# Patient Record
Sex: MALE | Race: BLACK OR AFRICAN AMERICAN
[De-identification: names, ages, dates, MRNs, and addresses within clinical notes are randomized per-mention and may not be internally consistent; named-entity substitution may affect disease eponyms.]

---

## 2018-09-14 ENCOUNTER — HOSPITAL ENCOUNTER (INPATIENT)
Dept: HOSPITAL 92 - ERS | Age: 63
LOS: 12 days | Discharge: NURSING, REHAB OR LONG-TERM CARE | DRG: 24 | End: 2018-09-26
Attending: INTERNAL MEDICINE | Admitting: INTERNAL MEDICINE
Payer: SELF-PAY

## 2018-09-14 VITALS — BODY MASS INDEX: 16.9 KG/M2

## 2018-09-14 DIAGNOSIS — R13.10: ICD-10-CM

## 2018-09-14 DIAGNOSIS — E86.0: ICD-10-CM

## 2018-09-14 DIAGNOSIS — I50.32: ICD-10-CM

## 2018-09-14 DIAGNOSIS — K21.9: ICD-10-CM

## 2018-09-14 DIAGNOSIS — E78.5: ICD-10-CM

## 2018-09-14 DIAGNOSIS — F10.231: ICD-10-CM

## 2018-09-14 DIAGNOSIS — I63.312: Primary | ICD-10-CM

## 2018-09-14 DIAGNOSIS — R47.02: ICD-10-CM

## 2018-09-14 DIAGNOSIS — R47.01: ICD-10-CM

## 2018-09-14 DIAGNOSIS — I11.0: ICD-10-CM

## 2018-09-14 DIAGNOSIS — F17.210: ICD-10-CM

## 2018-09-14 LAB
ALBUMIN SERPL BCG-MCNC: 3.9 G/DL (ref 3.4–4.8)
ALP SERPL-CCNC: 74 U/L (ref 40–150)
ALT SERPL W P-5'-P-CCNC: 61 U/L (ref 8–55)
ANION GAP SERPL CALC-SCNC: 14 MMOL/L (ref 10–20)
APTT PPP: 26.8 SEC (ref 22.9–36.1)
AST SERPL-CCNC: 86 U/L (ref 5–34)
BASOPHILS # BLD AUTO: 0.1 THOU/UL (ref 0–0.2)
BASOPHILS NFR BLD AUTO: 1.5 % (ref 0–1)
BILIRUB SERPL-MCNC: 0.3 MG/DL (ref 0.2–1.2)
BUN SERPL-MCNC: 12 MG/DL (ref 8.4–25.7)
CALCIUM SERPL-MCNC: 9.3 MG/DL (ref 7.8–10.44)
CHLORIDE SERPL-SCNC: 106 MMOL/L (ref 98–107)
CK MB SERPL-MCNC: 1.5 NG/ML (ref 0–6.6)
CO2 SERPL-SCNC: 21 MMOL/L (ref 23–31)
CREAT CL PREDICTED SERPL C-G-VRATE: 0 ML/MIN (ref 70–130)
EOSINOPHIL # BLD AUTO: 0.8 THOU/UL (ref 0–0.7)
EOSINOPHIL NFR BLD AUTO: 11.3 % (ref 0–10)
GLOBULIN SER CALC-MCNC: 4 G/DL (ref 2.4–3.5)
GLUCOSE SERPL-MCNC: 116 MG/DL (ref 80–115)
HGB BLD-MCNC: 14.3 G/DL (ref 14–18)
INR PPP: 1
LYMPHOCYTES # BLD: 2.5 THOU/UL (ref 1.2–3.4)
LYMPHOCYTES NFR BLD AUTO: 33.2 % (ref 21–51)
MCH RBC QN AUTO: 34 PG (ref 27–31)
MCV RBC AUTO: 101 FL (ref 78–98)
MONOCYTES # BLD AUTO: 0.6 THOU/UL (ref 0.11–0.59)
MONOCYTES NFR BLD AUTO: 8.3 % (ref 0–10)
NEUTROPHILS # BLD AUTO: 3.4 THOU/UL (ref 1.4–6.5)
NEUTROPHILS NFR BLD AUTO: 45.7 % (ref 42–75)
PLATELET # BLD AUTO: 259 THOU/UL (ref 130–400)
POTASSIUM SERPL-SCNC: 3.8 MMOL/L (ref 3.5–5.1)
PROTHROMBIN TIME: 13.4 SEC (ref 12–14.7)
RBC # BLD AUTO: 4.2 MILL/UL (ref 4.7–6.1)
SODIUM SERPL-SCNC: 137 MMOL/L (ref 136–145)
TROPONIN I SERPL DL<=0.01 NG/ML-MCNC: (no result) NG/ML (ref ?–0.03)
WBC # BLD AUTO: 7.4 THOU/UL (ref 4.8–10.8)

## 2018-09-14 PROCEDURE — 70450 CT HEAD/BRAIN W/O DYE: CPT

## 2018-09-14 PROCEDURE — C1769 GUIDE WIRE: HCPCS

## 2018-09-14 PROCEDURE — 82553 CREATINE MB FRACTION: CPT

## 2018-09-14 PROCEDURE — 70496 CT ANGIOGRAPHY HEAD: CPT

## 2018-09-14 PROCEDURE — 85025 COMPLETE CBC W/AUTO DIFF WBC: CPT

## 2018-09-14 PROCEDURE — 96365 THER/PROPH/DIAG IV INF INIT: CPT

## 2018-09-14 PROCEDURE — 3E03317 INTRODUCTION OF OTHER THROMBOLYTIC INTO PERIPHERAL VEIN, PERCUTANEOUS APPROACH: ICD-10-PCS | Performed by: INTERNAL MEDICINE

## 2018-09-14 PROCEDURE — C1757 CATH, THROMBECTOMY/EMBOLECT: HCPCS

## 2018-09-14 PROCEDURE — 70498 CT ANGIOGRAPHY NECK: CPT

## 2018-09-14 PROCEDURE — 36416 COLLJ CAPILLARY BLOOD SPEC: CPT

## 2018-09-14 PROCEDURE — 85610 PROTHROMBIN TIME: CPT

## 2018-09-14 PROCEDURE — 74018 RADEX ABDOMEN 1 VIEW: CPT

## 2018-09-14 PROCEDURE — 36415 COLL VENOUS BLD VENIPUNCTURE: CPT

## 2018-09-14 PROCEDURE — 93005 ELECTROCARDIOGRAM TRACING: CPT

## 2018-09-14 PROCEDURE — 93306 TTE W/DOPPLER COMPLETE: CPT

## 2018-09-14 PROCEDURE — C1887 CATHETER, GUIDING: HCPCS

## 2018-09-14 PROCEDURE — 03CG3ZZ EXTIRPATION OF MATTER FROM INTRACRANIAL ARTERY, PERCUTANEOUS APPROACH: ICD-10-PCS | Performed by: NEUROLOGICAL SURGERY

## 2018-09-14 PROCEDURE — A4216 STERILE WATER/SALINE, 10 ML: HCPCS

## 2018-09-14 PROCEDURE — 74230 X-RAY XM SWLNG FUNCJ C+: CPT

## 2018-09-14 PROCEDURE — 37184 PRIM ART M-THRMBC 1ST VSL: CPT

## 2018-09-14 PROCEDURE — 80053 COMPREHEN METABOLIC PANEL: CPT

## 2018-09-14 PROCEDURE — 80048 BASIC METABOLIC PNL TOTAL CA: CPT

## 2018-09-14 PROCEDURE — 85730 THROMBOPLASTIN TIME PARTIAL: CPT

## 2018-09-14 PROCEDURE — 84484 ASSAY OF TROPONIN QUANT: CPT

## 2018-09-14 RX ADMIN — Medication SCH EACH: at 21:16

## 2018-09-14 NOTE — CT
CT ANGIOGRAM OF THE HEAD

CT ANGIOGRAM OF THE NECK

9/14/18

 

HISTORY: 

Aphasia. Unable to stand and walk. Headache. 

 

COMPARISON:  

None. 

 

TECHNIQUE: 

CT angiogram of the head and neck are performed in the axial plane. Three dimensional reformatted daryn
ges are submitted for interpretation. 

 

FINDINGS:  

Cortical gray-white matter differentiation appears to be preserved. No definite hemorrhage or extra-a
xial hematoma. Adequate aeration of the sinuses and mastoid air cells. Bilateral ocular lenses are ap
propriately located. Both globes are intact. Retrobulbar fat is preserved.

 

Aerodigestive tract is patent. No obvious mass in the ------ cavity. Evaluation is limited by dental 
amalgam artifact. Epiglottis has normal caliber. Pre-epiglottic fat is preserved. There is no prevert
ebral soft tissue swelling. 

 

Symmetric attenuation of the parotid and submandibular glands. Appropriate attenuation of the thyroid
 gland. Symmetric attenuation of the sternocleidomastoid muscles.

 

Cervical spine vertebral body height is maintained. There is no fracture. There are fusion changes at
 C5-C6. Upper mediastinum and lung apices are unremarkable. Minimal dependent atelectatic changes. 

 

CT ANGIOGRAM:

The visualized aortic arch has appropriate enhancement and luminal diameter. 

 

RIGHT CAROTID:

The origin of the right carotid artery has appropriate enhancement and luminal diameter. There is mil
d atherosclerotic disease involving the distal common carotid artery and carotid bifurcation. Neverth
eless, no significant stenosis based upon NASCET criteria. 

 

Based on the sagittal reformatted images, there is short segment mild stenosis. 

 

LEFT CAROTID:

The origin of the left carotid artery has appropriate enhancement and luminal diameter. There is appr
opriate enhancement a

nd luminal diameter of the left common carotid artery. In the left carotid bifurcation, there is shor
t segment moderate stenosis based upon NASCET criteria (60% stenosis).

 

Both subclavian arteries are unremarkable. Both cervical vertebral arteries are patent throughout the
ir course in the neck. 

 

CT ANGIOGRAM OF THE HEAD:

There is appropriate enhancement in luminal diameter of the distal cervical and intracranial internal
 carotid arteries. 

 

ANTERIOR CIRCULATION:

There is appropriate enhancement and luminal diameter of the left and right A1 segments and the right
 M1 segments. There is abrupt truncation of contrast involving the proximal right M1 segment compatib
le with thrombosis. The overall number of vessels in the right and left MCA distributions are relativ
ely symmetric. Findings would imply appropriate collateralization at this time. 

 

POSTERIOR CIRCULATION:

Both PICA artery origins are unremarkable. Both PICA arteries supply a normal appearing basilar arter
y. The left and right P1 segments have symmetric enhancement and luminal diameter. 

 

IMPRESSION:  

1.      Thrombosis involving the left M1 segment. 

2.      Moderate stenosis involving the proximal left internal carotid artery. 

 

Results of the study discussed with Dr. Jewell 9/14/18 at 4:35 p.m.

 

Code CR 

 

POS: [facility]

## 2018-09-14 NOTE — CT
NONCONTRAST CT HEAD

9/14/18

 

HISTORY: 

Aphasia. Patient unable to walk or stand unassisted. Has had a headache all day. 

 

FINDINGS:  

Scattered low density areas are seen throughout the periventricular white matter which are nonspecifi
c but likely attributable to chronic small vessel ischemic changes. There are low density areas seen 
in each basal ganglia suggesting lacunar infarctions of indeterminate age. There is no evidence of an
 acute cortical infarction, hemorrhage, mass effect, or midline shift. There is a hyperdense asymmetr
ic left MCA which could be related to thrombus in the left MCA. No definitive cortical infarction is 
seen on the left. 

 

There is mild cerebral volume loss. The ventricular system is normal in size, shape and position. 

 

There is mild mucosal thickening involving the ethmoidal air cells, and left sphenoid sinus and to a 
lesser degree right sphenoid sinus. There is mild deformity of the anterior wall left maxillary antru
m as well as remote fracture and deformity involving the nasal bone. 

 

There is minimal scalp soft tissue swelling in the left supraorbital location.

 

IMPRESSION:  

1.      Hyperdense left MCA sign which suggesting acute thrombus within the left middle cerebral deb
ry. The gray-white differentiation does appear preserved, and no acute cortical infarction is seen on
 this exam. CT angiogram is recommended for further evaluation.

2.      Lacunar infarctions in each basal ganglia of indeterminate age. 

3.      Chronic small vessel ischemic changes and cerebral volume loss.

4.      Mild sinus disease.

5.      Remote fracture and deformity involving the nasal bone as well as remote fracture deformity o
f the anterior wall of the maxillary antrum. 

6.      Above findings discussed with Dr. Chery in the Emergency Department on 9/14/18 at 1616 hour
s. 

 

POS: [facility]

## 2018-09-14 NOTE — PRG
DATE OF SERVICE:  09/14/2018

 

SUBJECTIVE:  Mr. Griffin is a 62-year-old gentleman that was noted to have the abrupt onset of rig
ht hemiplegia and aphasia.  He was brought to the emergency room where he underwent a noncontrast hea
d CT which was negative for bleed.  He subsequently underwent a CT angiogram of the head which reveal
s presence of thrombus in the left M1 segment.

 

The ER physician called me and already initiated the TPA process.  She and I had a discussion and bas
ed on all criteria, made decision to bring the patient back to the angiogram suite for the purposes o
f cerebral angiography and potentially mechanical thrombectomy.

## 2018-09-15 NOTE — CT
CT BRAIN:

 

Date:  09/15/18 

 

PROVIDED CLINICAL HISTORY:   

Stroke. 

 

FINDINGS:

 

Comparison made with study dated 09/14/18. 

 

There is interval development of more conspicuous hypodensity involving the left caudate, anterior li
mb of left internal capsule, and left lentiform nucleus. The insular cortex is somewhat indistinct in
 portions. There is no evidence for intracranial hemorrhage with limitations due to patient motion. T
here is mild mass effect upon the frontal horn of the left lateral ventricle due to the caudate regio
n hypodensity. The extracranial soft tissues and osseous structures demonstrate an unremarkable CT ap
pearance. 

 

IMPRESSION: 

1.  Findings compatible with left MCA distribution infarction. 

2.  No evidence for intracranial hemorrhage. 

 

 

POS: [facility]

## 2018-09-15 NOTE — HP
CHIEF COMPLAINT:  Drug symptoms.

 

HISTORY OF PRESENT ILLNESS:  Patient is a 62-year-old male who apparently had a history of a prior st
roke who was in his usual state of health and presented to the emergency department with altered ment
al status and weakness.  In the emergency department, the patient had a CT of the brain, showed a hyp
erdense left MCA sign suggesting acute thrombus within the left MCA with no acute cortical infarction
 seen.  Lacunar infarctions of each basal ganglia of indeterminate age were noted.  Chronic small ves
adelaida ischemia, mild sinus disease and remote fracture deformity involving the nasal bone and maxillary
 antrum by a subsequent CT angiogram revealed thrombus involving the left M1 segment with moderate st
enosis involving the proximal left internal carotid artery.  Dr. Meraz evaluated the patient in the e
mergency department.  The patient had TPA initiated.  The patient was taken for mechanical thrombecto
my which apparently was unsuccessful.  Subsequently, the patient has remained somewhat aphasic with e
xpressive aphasia.  A repeat CT today shows findings compatible with left MCA distribution infarction
.

 

REVIEW OF SYSTEMS:  Not obtainable as the patient is suffering from some expressive aphasia.  The pat
ken's wife is unable to give significant more information, although she is present.

 

PAST MEDICAL HISTORY:  Notable for hypertension and occasional reflux symptoms.  She is unaware of an
y prior surgeries that he may have had.  She does not know anything about his past medical history pr
ior to that time that they have been together which she says was 2006.  She does report that he had a
 prior CVA in [place].

 

FAMILY HISTORY:  Unknown.

 

SOCIAL HISTORY:  The patient smokes a pack of cigarettes per day.  He drinks beer 2-3 cans per day.  
She is unaware if he does any drugs or not.

 

ALLERGIES:  None known.

 

MEDICATIONS:  The patient takes something for hypertension and occasionally for reflux, but those spe
cifics are not known.  The patient's wife does not know what they are.

 

PHYSICAL EXAMINATION:

VITAL SIGNS:  Temperature is 98.3, pulse 85, /61, O2 sat 97% on room air, /61.

GENERAL APPEARANCE:  Age appropriate male.  He is in no distress.  He is awake and somewhat alert.  H
e makes eye contact and will follow commands, and attempting to verbalize.  He tends to make more zoran
se than words.

HEENT:  He has arcus senilis.  No OP lesions.

CARDIOVASCULAR:  Regular rate and rhythm with no murmurs.

LUNGS:  Clear to auscultation bilaterally with good chest wall expansion, air exchange.

ABDOMEN:  Soft, nontender, nondistended.

EXTREMITIES:  Warm and dry without significant edema.

NEUROLOGIC:  The patient moves all extremities.  He will follow commands and has fairly good strength
 bilaterally, although it is unclear if he is given full efforts.  He does appear to understand that 
he is being asked a question and does try to verbalize response, but his responses again are not comp
rehensible.

 

LABORATORY DATA:  Initial labs from 09/14/2018, white count 7.4, hemoglobin 14.3, platelets 259.  INR
 1, PTT 26.8.  Chemistries notable for CO2 of 21, glucose 116, AST 86, ALT 61.

 

ASSESSMENT AND PLAN:

1.  Cerebrovascular accident of the left M1 distribution with tPA and mechanical thrombectomy.  The p
atient has some persistent left MCA infarct and has some expressive aphasia.  Appears to have fairly 
good movement, otherwise.  The patient will remain in the ICU until 24 hours after the tPA and interv
ention, then he can likely come to the stroke floor.  Neurology has been consulted as well as the str
[facility] team.

2.  Hypertension.  The patient apparently had some elevated blood pressure in the night and had a Car
dene drip started, which is now being weaned off as his blood pressures are quite good.

 

DISPOSITION:  I spoke to the patient's wife.  He is a FULL CODE presently.  We will continue to monit
or his recovery.  She reports that they are currently living with his brother, but is trying to get t
hemselves into an apartment.  We will need social work to help engage them to determine where the dis
position may ultimately be.

## 2018-09-15 NOTE — CON
DATE OF CONSULTATION:  09/15/2018

 

CONSULTING PHYSICIAN:  Hospitalist Service.

 

IMPRESSION:

1.  Left middle cerebral artery stroke.

2.  Hypertension.

3.  Tobacco use.

 

PLAN:

1.  Start aspirin after the 24-hour window from TPA.

2.  Echocardiogram.

3.  Reevaluate swallow function today.

 

HISTORY OF PRESENT ILLNESS:  Mr. Griffin is a 62-year-old gentleman who presented with expressive 
aphasia and right-sided weakness.  His CT angio revealed an M1 segment occlusion.  Dr. Meraz taken to
 the cath lab and perform thrombectomy.  He also received TPA.  He moved to the intensive care unit a
nd has been on a Cardene drip.  His blood pressures have been under good control, has not had any sub
sequent problems such as seizures.

 

PAST MEDICAL HISTORY:  As listed above.

 

ALLERGIES:  None.

 

SOCIAL HISTORY:  Positive for tobacco use.

 

FAMILY HISTORY:  Unknown.

 

REVIEW OF SYSTEMS:  Not obtainable.

 

PHYSICAL EXAMINATION:

VITAL SIGNS:  Pulse 88, blood pressure 133/56, respirations 15, saturations 96%.

HEENT:  Pupils equal and reactive.  Conjunctivae clear.  Oropharynx clear.

NECK:  Supple, no lymphadenopathy.

EXTREMITIES:  No cyanosis, clubbing or edema.

NEUROLOGIC:  He was alert and followed commands reasonably well.  He had significant expressive langu
age difficulties.  There is a right nasal labial fold flattening.  He had antigravity strength on the
 right side, though his rapid alternating movements were a bit sluggish.  Sensation was grossly intac
t.  Plantar responses were equivocal on the right and downgoing on the left.  Sensation seemed to be 
intact.  Gait is not testable.

 

LABORATORY STUDIES:  EKG shows normal sinus rhythm.

 

CT scan of the brain showed evidence of an evolving left MCA distribution ischemic changes.

 

SUMMARY:  This is a 62-year-old gentleman with a history of hypertension and thrombosis in the left M
1 segment.  He has some residual expressive aphasia, but is looking reasonably good in regards to par
alysis.  Agree with current care.  Complete his workup with an echocardiogram.

## 2018-09-15 NOTE — CON
DATE OF CONSULTATION:  09/15/2018

 

SERVICE:  Pulmonary Medicine.

 

REASON FOR CONSULTATION:  ICU patient.

 

HISTORY OF PRESENT ILLNESS:  The patient is a 62-year-old  male 
with past medical history significant for essentially nothing who presented to 
the hospital with an abrupt onset of neurologic changes.  He got TPA and 
underwent a JANES procedure with extraction of a large thrombus.  He was 
intubated for the procedure.  Afterwards, he was extubated.  Overnight, 
neurologically he remained intact.  He is following commands.  He is moving all 
4 extremities, but continues to have a persistent expressive aphasia.  As such, 
I cannot get additional elements of the history.

 

PAST MEDICAL HISTORY:

1.  Hypertension.

2.  Gastroesophageal reflux disease.

 

PAST SURGICAL HISTORY:  Unknown.

 

SOCIAL HISTORY:  The family denies significant alcohol or illicit drug use.  He 
uses tobacco on a daily basis.

 

FAMILY HISTORY:  Noncontributory.

 

ALLERGIES:  No known drug allergies.

 

MEDICATIONS:  List of his inpatient medications were reviewed.  No specific 
updates were made at this time.

 

REVIEW OF SYSTEMS:  General, head, ears, eyes, nose, throat, cardiovascular, 
respiratory, GI, , musculoskeletal, neurologic and skin is negative except as 
mentioned in the HPI.

 

PHYSICAL EXAMINATION:

VITAL SIGNS:  Afebrile, pulse 87, blood pressure 124/62, respirations 24, 
saturation 97% on room air.

GENERAL:  The patient is awake and alert, in no apparent distress.

LUNGS:  Excellent air entry with no prolonged expiratory phase, wheezing, 
rhonchi or crackles present.

HEART:  Normal rate, regular.

ABDOMEN:  Soft, nontender, nondistended.  Bowel sounds are positive.

MUSCULOSKELETAL:  No cyanosis or clubbing.  No pitting in the bilateral lower 
extremities.

NEUROLOGIC:  Grossly nonfocal.

 

LABORATORY DATA:  WBC 7.4, hemoglobin 14.3, platelets 259,000.  INR 1.0.  Basic 
metabolic profile and liver function studies are unremarkable except for a 
mildly elevated AST and ALT.  Cardiac enzymes are negative x1.

 

IMAGIN.  CT of the brain from this morning demonstrates findings compatible with 
left MCA distribution infarction with no evidence of intracranial hemorrhage.

2.  CT of the [place] demonstrates thrombus involving the left M1 
segment with moderate stenosis involving the proximal left internal carotid 
artery.

3.  Original CT of brain demonstrates hyperdense left MCA sign suggesting acute 
thrombus.  Lacunar infarcts are age indeterminate.  History of facial fractures.

 

ASSESSMENT:

1.  Cerebrovascular accident, status post TPA and JANES procedure.

2.  Hypertension.

 

DISCUSSION AND PLAN:  After 24 hours, the patient will be a candidate for 
transition out of the ICU to the stroke unit.  Pulmonary Critical Care will 
continue to follow if he remains in this location.

 

70 minutes have been devoted to this patient in various activities.  I 
personally reviewed all imaging studies and laboratory data noted within this 
document.  For fifty percent of this time, I was interacting with the patient 
at the bedside or coordinating care with the care team.  For the remainder of 
the time I was immediately available to the patient in the hospital unit.  



MICAH

## 2018-09-16 LAB
ANION GAP SERPL CALC-SCNC: 16 MMOL/L (ref 10–20)
BASOPHILS # BLD AUTO: 0.1 THOU/UL (ref 0–0.2)
BASOPHILS NFR BLD AUTO: 0.9 % (ref 0–1)
BUN SERPL-MCNC: 9 MG/DL (ref 8.4–25.7)
CALCIUM SERPL-MCNC: 8.9 MG/DL (ref 7.8–10.44)
CHLORIDE SERPL-SCNC: 102 MMOL/L (ref 98–107)
CO2 SERPL-SCNC: 20 MMOL/L (ref 23–31)
CREAT CL PREDICTED SERPL C-G-VRATE: 74 ML/MIN (ref 70–130)
EOSINOPHIL # BLD AUTO: 0.5 THOU/UL (ref 0–0.7)
EOSINOPHIL NFR BLD AUTO: 6 % (ref 0–10)
GLUCOSE SERPL-MCNC: 107 MG/DL (ref 80–115)
HGB BLD-MCNC: 14.4 G/DL (ref 14–18)
LYMPHOCYTES # BLD: 2.1 THOU/UL (ref 1.2–3.4)
LYMPHOCYTES NFR BLD AUTO: 23.8 % (ref 21–51)
MCH RBC QN AUTO: 33.4 PG (ref 27–31)
MCV RBC AUTO: 100 FL (ref 78–98)
MONOCYTES # BLD AUTO: 0.8 THOU/UL (ref 0.11–0.59)
MONOCYTES NFR BLD AUTO: 9 % (ref 0–10)
NEUTROPHILS # BLD AUTO: 5.3 THOU/UL (ref 1.4–6.5)
NEUTROPHILS NFR BLD AUTO: 60.3 % (ref 42–75)
PLATELET # BLD AUTO: 220 THOU/UL (ref 130–400)
POTASSIUM SERPL-SCNC: 3.5 MMOL/L (ref 3.5–5.1)
RBC # BLD AUTO: 4.3 MILL/UL (ref 4.7–6.1)
SODIUM SERPL-SCNC: 134 MMOL/L (ref 136–145)
WBC # BLD AUTO: 8.7 THOU/UL (ref 4.8–10.8)

## 2018-09-16 RX ADMIN — Medication SCH EACH: at 06:14

## 2018-09-16 NOTE — PDOC.PN
- Subjective


Encounter Start Date: 09/16/18


Encounter Start Time: 11:00





No speaking much because of expressive dysphasia, but denies any problems.





- Objective


Vital Signs & Weight: 


 Vital Signs (12 hours)











  Temp Pulse Pulse Pulse Pulse Resp BP


 


 09/16/18 12:51  98.9 F  88     16 


 


 09/16/18 12:31       


 


 09/16/18 12:00   81     


 


 09/16/18 10:10    76  70  72   204/92 H


 


 09/16/18 08:00  99.3 F  81     16 


 


 09/16/18 04:45  99.2 F  83     16 














  BP BP BP Pulse Ox


 


 09/16/18 12:51    212/103 H  98


 


 09/16/18 12:31    177/82 H 


 


 09/16/18 12:00    


 


 09/16/18 10:10  201/85 H  192/100 H  


 


 09/16/18 08:00    179/98 H  96


 


 09/16/18 04:45    185/95 H  96








 Weight











Admit Weight                   134 lb 14.766 oz


 


Weight                         134 lb 14.766 oz











 Most Recent Monitor Data











Heart Rate from ECG            66


 


NIBP                           144/68


 


NIBP BP-Mean                   99


 


Respiration from ECG           22


 


SpO2                           99














I&O: 


 











 09/15/18 09/16/18 09/17/18





 06:59 06:59 06:59


 


Intake Total 1043 280 900


 


Output Total 2580 860 


 


Balance -1537 -580 900











Result Diagrams: 


 09/16/18 08:17





 09/16/18 08:17





Phys Exam





- Physical Examination


Constitutional: NAD


Much more awake and alert.


Respiratory: no wheezing, no rales, no rhonchi, clear to auscultation bilateral


Cardiovascular: RRR, no significant murmur, no rub


Gastrointestinal: soft, non-tender, no distention, positive bowel sounds


Musculoskeletal: no edema


Good movement and strength of extremities.  Appears mentally intact, but


has expressive challenges.  


Psychiatric: normal affect





Dx/Plan


(1) CVA (cerebral vascular accident)


Code(s): I63.9 - CEREBRAL INFARCTION, UNSPECIFIED   Status: Acute   Comment: 

Had TPA and mechanical extraction of thrombus.  Primary residual is the 

expressive dysphasia.  Has some dysphagia as well.  Continue statin.  Add ASA.  

Continue with therapies.  Consider discharge options with CM tomorrow.   





(2) Expressive dysphasia


Code(s): R47.02 - DYSPHASIA   Status: Acute   Comment: Able to speak single 

words correctly from time to time.   





(3) Hypertension


Code(s): I10 - ESSENTIAL (PRIMARY) HYPERTENSION   Status: Acute   Comment: Has 

variable BP.  Was on BP meds at home, but his wife was unable to help us know 

what it was.  Some permissive HTN, but has PRN's for BP greater than 180.  

Higher than that now.  Will add amlodipine and HCTZ for daily treatment at this 

point.   





- Plan





* above.

## 2018-09-16 NOTE — PRG
DATE OF SERVICE:  09/16/2018

 

SERVICE:  Pulmonary Medicine.

 

INTERVAL HISTORY:  The patient is doing really remarkable from a neurologic standpoint.  He continues
 to have a little aphasia.  That being said, he has been able to get out of bed into the chair multip
le times throughout the day.  His strength actually seems to be pretty good.  He denies any shortness
 of breath.  He has a little bit of a cough, but is not bringing up any sputum.  He seems to be swall
owing okay with modifications that have been made.

 

PHYSICAL EXAMINATION:

VITAL SIGNS:  Afebrile, pulse 82, blood pressure 135/87, respirations 20, saturation 96% on room air.


GENERAL:  The patient is awake, alert, no apparent distress.

LUNGS:  Decent air entry with no prolonged expiratory phase.  There is rhonchi present, but it clears
 with cough.

HEART:  Normal rate, regular.

ABDOMEN:  Soft, nontender, nondistended.  Bowel sounds are positive.

MUSCULOSKELETAL:  No cyanosis or clubbing.  There is no pitting in the bilateral lower extremities.

NEUROLOGIC:  Grossly nonfocal.

 

LABORATORY DATA:  CBC is essentially unremarkable and/or stable.  Basic metabolic profile is signific
ant for sodium of 134.  Otherwise, it is stable.  Troponin is negative x1.

 

IMAGING:  Echocardiogram demonstrates 60% -65% ejection fraction with a diastolic dysfunction.

 

ASSESSMENT:

1.  Cerebrovascular of the left MCA, status post TPA and JANES procedure.

2.  Hypertension.

3.  Chronic diastolic heart failure.

 

DISCUSSION AND PLAN:  At this point, the patient is stable for transition out of the hospital from a 
purely respiratory perspective.  He has no further requirements for inpatient Pulmonary or Pulmonary 
or Critical Care opinion.  As such, I will sign off.  Please call with additional questions or concer
ns moving forward.

## 2018-09-16 NOTE — PRG
DATE OF SERVICE:  09/16/2018.

 

SUBJECTIVE:  Mr. Griffin is now 2 days status post TPA & mechanical 
thrombectomy for a left MCA occlusion.  Neurologically, he is doing 
extraordinarily well.  He is able to move both sides in the upper and lower 
extremities.  He is able to interact with me with respect to speech.  He does 
have mild degree of expressive aphasia.  This is a dramatic improvement as 
compared to his baseline NIH score upon presentation.

 

I will defer additional stratification for stroke risk factors and medical 
management to our hospital service.

 

MICAH

## 2018-09-17 RX ADMIN — Medication SCH ML: at 20:19

## 2018-09-17 RX ADMIN — Medication SCH ML: at 08:45

## 2018-09-17 NOTE — PDOC.EVN
Event Note





- Event Note


Event Note: 





Noticing the patient's elevated MCV, the difficulty in controlling his BP and 

the report that the patient was up and moving around much of the night, I 

followed up with the patient's wife about his alcohol consumption.  The 3 beers 

per day she initially reported that he drank daily are actually 24 oz cans 

rather than 12.  She states he has drank that much everyday since she has known 

him.  She says he drinks first thing in the morning.  He has beer in stead of 

breakfast.  I suspect this is some level of withdrawal and his BP is elevated 

as a result.  I will give some ativan now and start librium.

## 2018-09-17 NOTE — PDOC.PN
- Subjective


Encounter Start Date: 09/17/18


Encounter Start Time: 08:55





No new complaints.  Has removed his telemetry.  Wife is present.  She says the 

[facility] for the [facility].  Apparently he was on 

Lisinopril and HCTZ for BP at home.  She does not know what his blood pressure 

was running prior to this admission.  She did say [facility] was [facility] because his BP was not well controlled and the meds were to be 

increased.  They did not go that follow up, but moved here.  





- Objective


Vital Signs & Weight: 


 Vital Signs (12 hours)











  Temp Pulse Resp BP BP Pulse Ox


 


 09/17/18 08:49   75   193/96 H  


 


 09/17/18 08:41   75   193/96 H  


 


 09/17/18 08:01  98.4 F  72  16   206/105 H  96


 


 09/17/18 07:47   67   206/105 H  


 


 09/17/18 07:30       94 L


 


 09/17/18 05:32   90    


 


 09/17/18 04:00  99.3 F  90  24 H   186/103 H  98


 


 09/17/18 01:11   71    


 


 09/16/18 23:58  98.6 F  71  20   206/108 H  97


 


 09/16/18 23:29   86    


 


 09/16/18 20:56   86    








 Weight











Admit Weight                   134 lb 14.766 oz


 


Weight                         111 lb 12.8 oz











 Most Recent Monitor Data











Heart Rate from ECG            66


 


NIBP                           144/68


 


NIBP BP-Mean                   99


 


Respiration from ECG           22


 


SpO2                           99














I&O: 


 











 09/16/18 09/17/18 09/18/18





 06:59 06:59 06:59


 


Intake Total 280 900 


 


Output Total 860  


 


Balance -580 900 











Result Diagrams: 


 09/16/18 08:17





 09/16/18 08:17





Phys Exam





- Physical Examination


Constitutional: NAD


Speech is slightly better.  


Respiratory: no wheezing, no rales, no rhonchi, clear to auscultation bilateral


Cardiovascular: RRR, no significant murmur, no rub


Gastrointestinal: soft, non-tender, no distention, positive bowel sounds


Musculoskeletal: no edema


Improved, but persistent expressive dysphasia. 


Psychiatric: normal affect





Dx/Plan


(1) CVA (cerebral vascular accident)


Code(s): I63.9 - CEREBRAL INFARCTION, UNSPECIFIED   Status: Acute   Comment: 

Had TPA and mechanical extraction of thrombus.  Primary residual is the 

expressive dysphasia.  Has some dysphagia as well.  Continue statin.  Add ASA.  

Continue with therapies.  Consider discharge options with CM tomorrow.   





(2) Expressive dysphasia


Code(s): R47.02 - DYSPHASIA   Status: Acute   Comment: Using several words, but 

still very challenged.    





(3) Hypertension


Code(s): I10 - ESSENTIAL (PRIMARY) HYPERTENSION   Status: Acute   Comment: BP 

running very high overnight.  Has had several PRN IV meds given without much 

improvement.  Will increase the amlodipine dose and try the Labetalol since the 

hydralazine was not as helpful.  Suspect he was running fairly high at home 

too.  He appears asymptomatic, but challenging to know.   





- Plan





* Work on controlling BP today.  CM to work with patient's wife on dispo 

options.  They are living with his brother now.  She indicates that she has no 

way to prepare puree'd foods for him.

## 2018-09-17 NOTE — CCL
RADIOLOGY PROCEDURE NOTE:

 

Date:  09/14/18 

 

SURGEON:  Collin Meraz M.D. 

 

FIRST ASSISTANT:  None. 

 

INDICATION:  Ischemic stroke. 

 

DIAGNOSIS:  Left MCA occlusion. 

 

PROCEDURE:  Cerebral angiography with mechanical thrombectomy. 

 

ANESTHESIA:  General. 

 

TECHNIQUE:  

The patient was brought into the angiogram suite and placed under general anesthesia. Both groins wer
e prepped and draped in usual sterile fashion.  1% lidocaine was used to inject the right groin. A 5 
[place] micropuncture set was used to gain access to the right common femoral artery. Using the Seldin
kvng technique, an 8 [place] sheath was placed. An 8 [place] concentric guide catheter was passed over a
 130 cm Chavez II catheter, which was passed over a Beryllium guidewire, was then placed within the le
ft internal carotid artery. An AP and lateral angiogram was performed, which revealed complete occlus
ion of the mid portion of the left middle cerebral artery. A  Trevo device was deployed a total of 3 
times. On the 3rd time, there was complete restoration of flow into the middle cerebral artery territ
ory with TICI score equal to 3. Guide was removed. Sheath was sewn into place secondary to prior admi
nistration of TPA. The procedure came to an end without known complication.

 

IMPRESSION:  

The patient underwent successful diagnostic angiography. Angiography revealed the presence of a compl
ete occlusion of the left middle cerebral artery, which was suggested on the patient's CT angiogram. 
The patient underwent successful mechanical thrombectomy with restoration of blood flow into the midd
le cerebral artery.

## 2018-09-18 LAB
ANION GAP SERPL CALC-SCNC: 17 MMOL/L (ref 10–20)
BASOPHILS # BLD AUTO: 0.1 THOU/UL (ref 0–0.2)
BASOPHILS NFR BLD AUTO: 1.1 % (ref 0–1)
BUN SERPL-MCNC: 27 MG/DL (ref 8.4–25.7)
CALCIUM SERPL-MCNC: 9.8 MG/DL (ref 7.8–10.44)
CHLORIDE SERPL-SCNC: 94 MMOL/L (ref 98–107)
CO2 SERPL-SCNC: 22 MMOL/L (ref 23–31)
CREAT CL PREDICTED SERPL C-G-VRATE: 46 ML/MIN (ref 70–130)
EOSINOPHIL # BLD AUTO: 0.1 THOU/UL (ref 0–0.7)
EOSINOPHIL NFR BLD AUTO: 1.7 % (ref 0–10)
GLUCOSE SERPL-MCNC: 124 MG/DL (ref 80–115)
HGB BLD-MCNC: 15.6 G/DL (ref 14–18)
LYMPHOCYTES # BLD: 1.1 THOU/UL (ref 1.2–3.4)
LYMPHOCYTES NFR BLD AUTO: 12.8 % (ref 21–51)
MCH RBC QN AUTO: 33.4 PG (ref 27–31)
MCV RBC AUTO: 98.5 FL (ref 78–98)
MONOCYTES # BLD AUTO: 0.9 THOU/UL (ref 0.11–0.59)
MONOCYTES NFR BLD AUTO: 10.6 % (ref 0–10)
NEUTROPHILS # BLD AUTO: 6.5 THOU/UL (ref 1.4–6.5)
NEUTROPHILS NFR BLD AUTO: 73.9 % (ref 42–75)
PLATELET # BLD AUTO: 221 THOU/UL (ref 130–400)
POTASSIUM SERPL-SCNC: 3.5 MMOL/L (ref 3.5–5.1)
RBC # BLD AUTO: 4.66 MILL/UL (ref 4.7–6.1)
SODIUM SERPL-SCNC: 129 MMOL/L (ref 136–145)
WBC # BLD AUTO: 8.8 THOU/UL (ref 4.8–10.8)

## 2018-09-18 RX ADMIN — Medication SCH ML: at 20:19

## 2018-09-18 RX ADMIN — Medication SCH ML: at 08:52

## 2018-09-18 NOTE — PDOC.PN
- Subjective


Encounter Start Date: 09/18/18


Encounter Start Time: 09:41





Still has some agitation.  Wife said he was agitated with her when she tried to 

keep him from getting out of bed.  No other issues noted.





- Objective


Vital Signs & Weight: 


 Vital Signs (12 hours)











  Temp Pulse Resp BP Pulse Ox


 


 09/18/18 08:51   98   


 


 09/18/18 08:00  98.5 F  98  14  185/99 H  93 L


 


 09/18/18 04:00  98.0 F  81  18  163/88 H  96


 


 09/18/18 00:00  97.7 F  91  19  184/98 H  95








 Weight











Admit Weight                   134 lb 14.766 oz


 


Weight                         109 lb 9.6 oz











 Most Recent Monitor Data











Heart Rate from ECG            66


 


NIBP                           144/68


 


NIBP BP-Mean                   99


 


Respiration from ECG           22


 


SpO2                           99














I&O: 


 











 09/17/18 09/18/18 09/19/18





 06:59 06:59 06:59


 


Intake Total 900 900 


 


Balance 900 900 











Result Diagrams: 


 09/18/18 05:40





 09/18/18 05:40





Phys Exam





- Physical Examination


Constitutional: NAD


Somnolent/slightly sedated. 


Respiratory: no wheezing, no rales, no rhonchi, clear to auscultation bilateral


Cardiovascular: RRR, no significant murmur, no rub


Gastrointestinal: soft, non-tender, no distention, positive bowel sounds


Musculoskeletal: no edema





Dx/Plan


(1) CVA (cerebral vascular accident)


Code(s): I63.9 - CEREBRAL INFARCTION, UNSPECIFIED   Status: Acute   Comment: 

Had TPA and mechanical extraction of thrombus.  Primary residual is the 

expressive dysphasia.  Has some dysphagia as well.  Continue statin.  Add ASA.  

Continue with therapies.  Consider discharge options with CM tomorrow.  

Disposition limited by EtOH WD symptoms.    





(2) Expressive dysphasia


Code(s): R47.02 - DYSPHASIA   Status: Acute   Comment: Using several words, but 

still very challenged.    





(3) Hypertension


Code(s): I10 - ESSENTIAL (PRIMARY) HYPERTENSION   Status: Acute   Comment: BP 

still high, but improved.  Has PRN's.  Started Amlodipine, HCTZ, Metoprolol 

sceduled.  Baseline HTN and exacerbated by EtOH WD.   





(4) Alcohol withdrawal delirium, acute, hyperactive


Code(s): F10.231 - ALCOHOL DEPENDENCE WITH WITHDRAWAL DELIRIUM   Status: Acute 

  Comment: Thiamine, Folate, scheduled Librium and PRN ativan.     





- Plan





* Need to work through the EtOH WD.

## 2018-09-18 NOTE — RAD
KUB:

9/18/18

 

HISTORY: 

Dobhoff tube placement. 

 

Dobhoff feeding tube is present. The tip is in the distal esophagus. The tube is coiled with the core
 portion in the fundus region but the tip directed cephalad. 

 

IMPRESSION:  

Dobhoff feeding tube with the tip of the tube in the distal esophagus and the tip is directed cephala
d.

 

POS: [facility]
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
## 2018-09-18 NOTE — RAD
AP VIEW ABDOMEN

9/18/18

 

HISTORY: 

NG tube placement. 

 

AP view abdomen obtained. The Dobhoff tube has been pulled back when compared to the previous exam fr
om earlier in the day. The distal tip of the Dobhoff tube is just above the gastroesophageal junction
. The Dobhoff tube needs to be advanced approximately 10 cm to be in good position to be in the stoma
ch and advanced approximately 20 cm to be in the duodenum. 

 

IMPRESSION:  

The Dobhoff tube is at the level of the GE junction. 

 

POS: [facility]

## 2018-09-19 RX ADMIN — Medication SCH ML: at 07:47

## 2018-09-19 NOTE — RAD
KUB:

 

HISTORY:

Dobhoff tube placement.

 

COMPARISON:

Prior day's study.

 

FINDINGS:

A Dobhoff feeding tube is seen.  The tip of the tube is in the fundus of the stomach.

 

IMPRESSION:

Dobhoff feeding tube with the tip in the fundus of the stomach.

 

POS: [facility]

## 2018-09-19 NOTE — PDOC.PN
- Subjective


Encounter Start Date: 09/19/18


Encounter Start Time: 11:20





Essentially non-verbal.  





- Objective


Vital Signs & Weight: 


 Vital Signs (12 hours)











  Temp Pulse Pulse Pulse Resp BP BP


 


 09/19/18 12:05       121/71 


 


 09/19/18 11:37  97.6 F  66    17  


 


 09/19/18 08:55    63  65    134/79


 


 09/19/18 08:05       109/61 


 


 09/19/18 07:45   76     109/61 


 


 09/19/18 07:30       


 


 09/19/18 07:28  99.1 F  76    16  


 


 09/19/18 04:00  98.6 F  85    22 H  131/73 














  BP BP Pulse Ox


 


 09/19/18 12:05   


 


 09/19/18 11:37   121/71  96


 


 09/19/18 08:55  117/71  


 


 09/19/18 08:05   


 


 09/19/18 07:45   


 


 09/19/18 07:30    99


 


 09/19/18 07:28   109/61  99


 


 09/19/18 04:00   131/73  95








 Weight











Admit Weight                   134 lb 14.766 oz


 


Weight                         116 lb 3 oz











 Most Recent Monitor Data











Heart Rate from ECG            66


 


NIBP                           144/68


 


NIBP BP-Mean                   99


 


Respiration from ECG           22


 


SpO2                           99














I&O: 


 











 09/18/18 09/19/18 09/20/18





 06:59 06:59 06:59


 


Intake Total 900 95 300


 


Balance 900 95 300











Result Diagrams: 


 09/18/18 05:40





 09/18/18 05:40





Phys Exam





- Physical Examination


Constitutional: NAD


Respiratory: no wheezing, no rales, no rhonchi, clear to auscultation bilateral


Cardiovascular: RRR, no significant murmur, no rub


Gastrointestinal: soft, non-tender, no distention, positive bowel sounds


Musculoskeletal: no edema


Expressive dysphagia.  


Psychiatric: normal affect





Dx/Plan


(1) CVA (cerebral vascular accident)


Code(s): I63.9 - CEREBRAL INFARCTION, UNSPECIFIED   Status: Acute   Comment: 

Had TPA and mechanical extraction of thrombus.  Primary residual is the 

expressive dysphasia.  Has some dysphagia as well.  Continue statin.  Add ASA.  

Continue with therapies.  Referal sent to inpatient rehab.   





(2) Expressive dysphasia


Code(s): R47.02 - DYSPHASIA   Status: Acute   Comment: Using several words, but 

still very challenged.    





(3) Hypertension


Code(s): I10 - ESSENTIAL (PRIMARY) HYPERTENSION   Status: Acute   Comment: Much 

improved.  Started Amlodipine, HCTZ, Metoprolol scheduled.     





(4) Alcohol withdrawal delirium, acute, hyperactive


Code(s): F10.231 - ALCOHOL DEPENDENCE WITH WITHDRAWAL DELIRIUM   Status: Acute 

  Comment: Thiamine, Folate, scheduled Librium and PRN ativan. Decreasing the 

Librium to 10 mg as he appears to be much better.     





(5) Dysphagia


Code(s): R13.10 - DYSPHAGIA, UNSPECIFIED   Status: Acute   Comment: Patient 

failed the MBS.  Will likely need PEG.  Will discuss with patient and family.  

   





- Plan





* Need to work toward PEG and then rehab.  WD symptoms appear to be much 

improved.

## 2018-09-19 NOTE — RAD
MODIFIED BARIUM SWALLOW PERFORMED WITH SPEECH THERAPIST:

 

HISTORY:

Dysphagia.  Feeding difficulty.  The patient has a history of a stroke.

 

The patient was given thin barium and honey, nectar, and pudding textures, which showed aspiration wi
th several of the consistencies.  Penetration was only demonstrated with the thicker pudding, but onl
y a small amount of pudding was given.  The exam was terminated at that time.

 

IMPRESSION:

Aspiration with various materials.

 

POS: [facility]

## 2018-09-20 LAB
ANION GAP SERPL CALC-SCNC: 14 MMOL/L (ref 10–20)
BUN SERPL-MCNC: 52 MG/DL (ref 8.4–25.7)
CALCIUM SERPL-MCNC: 9.5 MG/DL (ref 7.8–10.44)
CHLORIDE SERPL-SCNC: 97 MMOL/L (ref 98–107)
CO2 SERPL-SCNC: 26 MMOL/L (ref 23–31)
CREAT CL PREDICTED SERPL C-G-VRATE: 43 ML/MIN (ref 70–130)
GLUCOSE SERPL-MCNC: 105 MG/DL (ref 80–115)
POTASSIUM SERPL-SCNC: 3.6 MMOL/L (ref 3.5–5.1)
SODIUM SERPL-SCNC: 133 MMOL/L (ref 136–145)

## 2018-09-20 RX ADMIN — Medication SCH ML: at 09:16

## 2018-09-20 RX ADMIN — Medication SCH ML: at 20:35

## 2018-09-20 RX ADMIN — Medication SCH ML: at 00:48

## 2018-09-20 NOTE — PDOC.PN
- Subjective


Encounter Start Date: 09/20/18


Encounter Start Time: 08:40





Doing well.  Speech improved, but still minimally communicative. 





- Objective


Vital Signs & Weight: 


 Vital Signs (12 hours)











  Temp Pulse Resp BP BP Pulse Ox


 


 09/20/18 08:00  97.8 F  84  18   128/84  96


 


 09/20/18 04:00  98.5 F  78  18  125/76  125/76  95


 


 09/20/18 00:00  98.7 F  85  16   132/77  96








 Weight











Admit Weight                   134 lb 14.766 oz


 


Weight                         116 lb 3 oz











 Most Recent Monitor Data











Heart Rate from ECG            66


 


NIBP                           144/68


 


NIBP BP-Mean                   99


 


Respiration from ECG           22


 


SpO2                           99














I&O: 


 











 09/19/18 09/20/18 09/21/18





 06:59 06:59 06:59


 


Intake Total 95 390 


 


Balance 95 390 











Result Diagrams: 


 09/18/18 05:40





 09/20/18 05:17





Phys Exam





- Physical Examination


Constitutional: NAD


Respiratory: no wheezing, no rales, no rhonchi, clear to auscultation bilateral


Cardiovascular: RRR, no significant murmur, no rub


Gastrointestinal: soft, non-tender, no distention, positive bowel sounds


Musculoskeletal: no edema


Expressive dysphasia.


Skin: normal turgor





Dx/Plan


(1) CVA (cerebral vascular accident)


Code(s): I63.9 - CEREBRAL INFARCTION, UNSPECIFIED   Status: Acute   Comment: 

Had TPA and mechanical extraction of thrombus.  Primary residual is the 

expressive dysphasia.  Has some dysphagia as well.  Continue statin.  Add ASA.  

Continue with therapies.  Referal sent to inpatient rehab.   





(2) Expressive dysphasia


Code(s): R47.02 - DYSPHASIA   Status: Acute   Comment: Using several words, but 

still very challenged. Appears to be improving.    





(3) Hypertension


Code(s): I10 - ESSENTIAL (PRIMARY) HYPERTENSION   Status: Acute   Comment: Much 

improved.  Started Amlodipine, HCTZ, Metoprolol scheduled.     





(4) Alcohol withdrawal delirium, acute, hyperactive


Code(s): F10.231 - ALCOHOL DEPENDENCE WITH WITHDRAWAL DELIRIUM   Status: Acute 

  Comment: Thiamine, Folate, scheduled Librium and PRN ativan. Will discontinue 

the librium.   





(5) Dysphagia


Code(s): R13.10 - DYSPHAGIA, UNSPECIFIED   Status: Acute   Comment: Patient 

failed the MBS.  He is on modified diet now.  He ate about 90% of breakfast and 

drank his juice and milk.  Encouraged that he may be able to do better as be 

remove the librium entirely in light of the resolving WD symptoms.  I discussed 

the possibility of a PEG.  I think he understands the situation adequately and 

he is amenable to a PEG if it becomes necessary.  I think he can consent for 

himself.  He has had two DHT's placed and both were pulled out.  Unclear if 

that was accidental or intentional.  Nursing believes it was accidentally 

pulled out when he was eating.     





(6) Dehydration


Code(s): E86.0 - DEHYDRATION   Status: Acute   Comment: His intake has been 

poor until this morning.  He has not been able to maintain a DHT.  BUN is up 

today.  Suspect dehyrating.  Will give IV fluids today.  Encouraged better PO 

intake.    





- Plan





* Monitor intake today.  If inadequate, consider PEG tomorrow.  If ok, can 

likely to go rehab if approved.

## 2018-09-21 RX ADMIN — Medication SCH: at 08:37

## 2018-09-21 RX ADMIN — Medication SCH ML: at 20:13

## 2018-09-21 NOTE — PDOC.PN
- Subjective


Encounter Start Date: 09/21/18


Encounter Start Time: 10:00


Subjective: no sob, awake, knows his girlfriend is at bedside


-: didn't like much of his breakfast


-: ate well his lunch and dinner yesterday per staff





- Objective


MAR Reviewed: Yes


Vital Signs & Weight: 


 Vital Signs (12 hours)











  Temp Pulse Pulse Pulse Resp BP BP


 


 09/21/18 11:45  98 F  66    16  


 


 09/21/18 09:29   64     


 


 09/21/18 08:40    80  83    220/98 H


 


 09/21/18 08:36   68     155/88 H 


 


 09/21/18 08:27       


 


 09/21/18 08:04       


 


 09/21/18 08:00  97.5 F L  70    16  


 


 09/21/18 07:39       


 


 09/21/18 03:40  98.9 F  62    18  














  BP BP Pulse Ox


 


 09/21/18 11:45   148/98 H  97


 


 09/21/18 09:29   170/96 H 


 


 09/21/18 08:40  158/94 H  


 


 09/21/18 08:36   


 


 09/21/18 08:27    98


 


 09/21/18 08:04    98


 


 09/21/18 08:00   155/88 H  98


 


 09/21/18 07:39    98


 


 09/21/18 03:40   168/86 H  98








 Weight











Admit Weight                   134 lb 14.766 oz


 


Weight                         116 lb 3 oz











 Most Recent Monitor Data











Heart Rate from ECG            66


 


NIBP                           144/68


 


NIBP BP-Mean                   99


 


Respiration from ECG           22


 


SpO2                           99














I&O: 


 











 09/20/18 09/21/18 09/22/18





 06:59 06:59 06:59


 


Intake Total 390 300 


 


Balance 390 300 











Result Diagrams: 


 09/18/18 05:40





 09/20/18 05:17





Phys Exam





- Physical Examination


HEENT: PERRLA, moist MMs


Neck: no JVD, supple


Respiratory: no wheezing, no rales


Cardiovascular: RRR, no significant murmur


Gastrointestinal: soft, non-tender, positive bowel sounds


Musculoskeletal: no edema, pulses present


Neurological: non-focal, moves all 4 limbs





Dx/Plan


(1) CVA (cerebral vascular accident)


Code(s): I63.9 - CEREBRAL INFARCTION, UNSPECIFIED   Status: Acute   Comment: 

Had TPA and mechanical extraction of thrombus.  Primary residual is the 

expressive dysphasia.  Has some dysphagia as well.  Continue statin.  Add ASA.  

Continue with therapies.     





(2) Dyslipidemia


Code(s): E78.5 - HYPERLIPIDEMIA, UNSPECIFIED   Status: Chronic   





(3) Aphasia


Code(s): R47.01 - APHASIA   Status: Acute   





(4) Dysphagia


Code(s): R13.10 - DYSPHAGIA, UNSPECIFIED   Status: Acute   





(5) Hypertension


Code(s): I10 - ESSENTIAL (PRIMARY) HYPERTENSION   Status: Acute   


Qualifiers: 


   Hypertension type: essential hypertension   Qualified Code(s): I10 - 

Essential (primary) hypertension   


Comment: Much improved.  On Amlodipine, HCTZ, Metoprolol scheduled.     





- Plan


hemo/neuro stable


-: reduce iv fluids


-: on asp, lipitor


-: ambulate well with rw


-: awaiting placement, still has some cognitive dysfunction, dysphagia and aph





* .








Review of Systems





- Medications/Allergies


Allergies/Adverse Reactions: 


 Allergies











Allergy/AdvReac Type Severity Reaction Status Date / Time


 


No Known Allergies Allergy   Unverified 09/14/18 19:32











Medications: 


 Current Medications





Amlodipine Besylate (Norvasc)  10 mg PO DAILY [facility]


   Last Admin: 09/21/18 08:36 Dose:  10 mg


Aspirin (Aspirin)  325 mg PO DAILY [facility]


   Last Admin: 09/21/18 08:37 Dose:  325 mg


Atorvastatin Calcium (Lipitor)  80 mg PO HS [facility]


   Last Admin: 09/20/18 20:34 Dose:  80 mg


Enoxaparin Sodium (Lovenox)  40 mg SC 0900 [facility]


   Last Admin: 09/21/18 08:36 Dose:  40 mg


Folic Acid (Folvite)  1 mg PO DAILY [facility]


   Last Admin: 09/21/18 08:37 Dose:  1 mg


Hydralazine HCl (Apresoline)  20 mg SLOW IVP Q15MIN PRN


   PRN Reason: SBP Greater Than 180


   Last Admin: 09/17/18 08:41 Dose:  20 mg


Hydrochlorothiazide (Hydrochlorothiazide)  25 mg PO DAILY [facility]


   Last Admin: 09/21/18 08:37 Dose:  25 mg


Sodium Chloride (1/2 Normal Saline)  1,000 mls @ 50 mls/hr IV .Q20H [facility]


Labetalol HCl (Normodyne)  20 mg SLOW IVP Q10MIN PRN


   PRN Reason: SBP Greater Than 180


   Last Admin: 09/18/18 15:52 Dose:  20 mg


Lorazepam (Ativan)  1 mg PO Q4H PRN


   PRN Reason: Anxiety/Agitation


   Last Admin: 09/19/18 15:57 Dose:  1 mg


Lorazepam (Ativan)  2 mg SLOW IVP Q6H PRN


   PRN Reason: Anxiety/Agitation


   Last Admin: 09/17/18 20:20 Dose:  2 mg


Metoprolol Tartrate (Lopressor)  25 mg PO BID [facility]


   Last Admin: 09/21/18 08:37 Dose:  25 mg


Sodium Chloride (Flush - Normal Saline)  10 ml IVF Q12HR [facility]


   Last Admin: 09/21/18 08:37 Dose:  Not Given


Sodium Chloride (Flush - Normal Saline)  10 ml IVF PRN PRN


   PRN Reason: Saline Flush


Thiamine HCl (Thiamine)  100 mg PO DAILY [facility]


   Last Admin: 09/21/18 08:37 Dose:  100 mg

## 2018-09-21 NOTE — PQF
ATUL REICH, BHAVANI MINOR MD

[id number]                                                             U-A10

[id number]                             

                                   

CLINICAL DOCUMENTATION IMPROVEMENT CLARIFICATION FORM:  ICD-10 Updated



PLEASE DO AN ADDENDUM TO THE PROGRESS NOTE WITH ANY DOCUMENTATION UPDATES OR 
ADDITIONS AND CARRY THROUGH TO DC SUMMARY.   THANK YOU.



Date:   9-21-18                                                              
ATTN:  DR. DEL REAL



Please exercise your independent, professional judgment in responding to the 
clarification form. 

Clinical indicators are provided on the bottom of this form for your review



Please check appropriate box(s):

[  ] Protein Calorie Malnutrition:    [  ] Mild      [  ] Moderate   [  ] 
Severe   

[  ] Other Malnutrition (please specify) _______________________________________
__

[  ] Underweight without malnutrition

[  ] Cachexia 

[  x] Other diagnosis _Has ac cva and is slowly increasing his oral intake._____
_____

[  ] Unable to determine



In addition, please specify:

Present on Admission (POA):  [  ] Yes             [  ] No             [  ] 
Unable to determine



CLINICAL INDICATORS - SIGNS / SYMPTOMS / LABS



BMI 17.6

DIETARY CONSULT:

19% wt loss compared to admit wt

69% last 4 meals consumed and girlfriend report of limited PO intake





RISK FACTORS

H&P:  ACUTE THROMBUS W/ LEFT MCA

          DRINKS 2-3 CANS OF BEER PER DAY



TREATMENT:



9-15/9/18: Dietary consult



Dietary Recommendations: 

1. Continue Heart Healthy/purree/nectar thick. Texture/consistency per SLP. 

2. If patient safe for PO intake, recommend a Heart Healthy diet type with 
Ensure Enlive BID.

3. Consider appetite stimulant.

4. If unable to take PO, recommend initiating Jevity 1.2 and advancing as 
tolerated to goal rate of 60 mL/hr with 95 mL water flushes every 4 hours once

    IVF is off. 





Moderate Malnutrition (in acute illness)

 Energy Intake: <75% of estimated energy requirement for > 7 days

 Weight Loss:  1-2%/1 week;  5%/ 1 month; 7.5%/3 months

 Other: mild body fat loss; mild muscle mass loss; mild fluid accumulation; 



Severe Malnutrition (in acute illness)

 Energy Intake: < 50% of estimated energy requirement for > 5 days

 Weight Loss: >1-2%/1 week; >5%/1 month; >7.5%/3 months

 Other: moderate body fat loss; moderate muscle mass loss; moderate- severe 
fluid accumulation; measurably reduced  strength



Moderate Malnutrition (in chronic illness)

 Energy Intake: <75% of estimated energy requirement for >1 month

 Weight Loss: 5%/1 month; 7.5%/3 months; 10%/6 months; 20%/1 year

 Other: mild body fat loss; mild muscle mass loss; mild fluid accumulation



Severe Malnutrition (in chronic illness)

 Energy Intake: <75% of estimated energy requirement for >1 month

 Weight Loss: >5%/1 month; >7.5%/3 months; >10%/6 months; >20%/1 year

 Other: severe body fat loss; severe muscle mass loss; severe fluid 
accumulation; measurably reduced  strength





THANK YOU,

CHIOMA



(This form is maintained as a part of the permanent medical record)

 2015 [employer]  All Rights Reserved

Chioma Mclaughlin RN, BS    [email or web address]    Cell Phone:  [phone]

                                                              

[facility]

## 2018-09-22 LAB
ANION GAP SERPL CALC-SCNC: 18 MMOL/L (ref 10–20)
BASOPHILS # BLD AUTO: 0.1 THOU/UL (ref 0–0.2)
BASOPHILS NFR BLD AUTO: 0.8 % (ref 0–1)
BUN SERPL-MCNC: 20 MG/DL (ref 8.4–25.7)
CALCIUM SERPL-MCNC: 10.3 MG/DL (ref 7.8–10.44)
CHLORIDE SERPL-SCNC: 93 MMOL/L (ref 98–107)
CO2 SERPL-SCNC: 22 MMOL/L (ref 23–31)
CREAT CL PREDICTED SERPL C-G-VRATE: 65 ML/MIN (ref 70–130)
EOSINOPHIL # BLD AUTO: 0.3 THOU/UL (ref 0–0.7)
EOSINOPHIL NFR BLD AUTO: 3.6 % (ref 0–10)
GLUCOSE SERPL-MCNC: 111 MG/DL (ref 80–115)
HGB BLD-MCNC: 17.2 G/DL (ref 14–18)
LYMPHOCYTES # BLD: 2.7 THOU/UL (ref 1.2–3.4)
LYMPHOCYTES NFR BLD AUTO: 28.5 % (ref 21–51)
MCH RBC QN AUTO: 32.6 PG (ref 27–31)
MCV RBC AUTO: 97.5 FL (ref 78–98)
MONOCYTES # BLD AUTO: 1.1 THOU/UL (ref 0.11–0.59)
MONOCYTES NFR BLD AUTO: 11.6 % (ref 0–10)
NEUTROPHILS # BLD AUTO: 5.2 THOU/UL (ref 1.4–6.5)
NEUTROPHILS NFR BLD AUTO: 55.5 % (ref 42–75)
PLATELET # BLD AUTO: 321 THOU/UL (ref 130–400)
POTASSIUM SERPL-SCNC: 3.8 MMOL/L (ref 3.5–5.1)
RBC # BLD AUTO: 5.26 MILL/UL (ref 4.7–6.1)
SODIUM SERPL-SCNC: 129 MMOL/L (ref 136–145)
WBC # BLD AUTO: 9.4 THOU/UL (ref 4.8–10.8)

## 2018-09-22 RX ADMIN — Medication SCH: at 09:03

## 2018-09-22 RX ADMIN — Medication SCH: at 18:59

## 2018-09-22 NOTE — EKG
Test Reason : 

Blood Pressure : ***/*** mmHG

Vent. Rate : 080 BPM     Atrial Rate : 081 BPM

   P-R Int : 174 ms          QRS Dur : 100 ms

    QT Int : 372 ms       P-R-T Axes : 035 023 021 degrees

   QTc Int : 429 ms

 

*** Poor data quality, interpretation may be adversely affected

Normal sinus rhythm

Possible Left atrial enlargement

Left ventricular hypertrophy

ST elevation, consider early repolarization, pericarditis, or injury

Abnormal ECG

 

Confirmed by PK WESTON DO ([id number]),  VETO OLIVER ([id number]) on 9/22/2018 11:52:55 AM

 

Referred By:             Confirmed By:PK WESTON DO

## 2018-09-22 NOTE — PDOC.PN
- Subjective


Encounter Start Date: 09/22/18


Encounter Start Time: 13:30


Subjective: awake, eating well per staff





- Objective


MAR Reviewed: Yes


Vital Signs & Weight: 


 Vital Signs (12 hours)











  Temp Pulse Resp BP BP Pulse Ox


 


 09/22/18 12:00     141/79 H  


 


 09/22/18 11:26  97.8 F  64  16   141/79 H  99


 


 09/22/18 09:02   67   164/88 H  


 


 09/22/18 08:00     164/88 H   96


 


 09/22/18 07:25  97.9 F  67  18   164/88 H  96


 


 09/22/18 04:00  97.6 F  63  18   151/78 H  98








 Weight











Admit Weight                   134 lb 14.766 oz


 


Weight                         105 lb 1.6 oz











 Most Recent Monitor Data











Heart Rate from ECG            66


 


NIBP                           144/68


 


NIBP BP-Mean                   99


 


Respiration from ECG           22


 


SpO2                           99














I&O: 


 











 09/21/18 09/22/18 09/23/18





 06:59 06:59 06:59


 


Intake Total 300  


 


Balance 300  











Result Diagrams: 


 09/22/18 09:41





 09/22/18 09:41





Phys Exam





- Physical Examination


HEENT: PERRLA, moist MMs


Neck: no JVD, supple


Respiratory: no wheezing, no rales


Cardiovascular: RRR, no significant murmur


Gastrointestinal: soft, non-tender, positive bowel sounds


Musculoskeletal: no edema, pulses present


Neurological: non-focal, moves all 4 limbs





Dx/Plan


(1) CVA (cerebral vascular accident)


Code(s): I63.9 - CEREBRAL INFARCTION, UNSPECIFIED   Status: Acute   Comment: 

Had TPA and mechanical extraction of thrombus.  Primary residual is the 

expressive dysphasia.  Has some dysphagia as well.  Continue statin.  Add ASA.  

Continue with therapies.     





(2) Dyslipidemia


Code(s): E78.5 - HYPERLIPIDEMIA, UNSPECIFIED   Status: Chronic   





(3) Aphasia


Code(s): R47.01 - APHASIA   Status: Acute   





(4) Dysphagia


Code(s): R13.10 - DYSPHAGIA, UNSPECIFIED   Status: Acute   





(5) Hypertension


Code(s): I10 - ESSENTIAL (PRIMARY) HYPERTENSION   Status: Acute   


Qualifiers: 


   Hypertension type: essential hypertension   Qualified Code(s): I10 - 

Essential (primary) hypertension   


Comment: Much improved.  On Amlodipine, HCTZ, Metoprolol scheduled.     





- Plan


awaiting placement


-: is tolerating oral diet


-: on asp, lipitor


-: to ambulate with PT as tolerated





* .








Review of Systems





- Medications/Allergies


Allergies/Adverse Reactions: 


 Allergies











Allergy/AdvReac Type Severity Reaction Status Date / Time


 


No Known Allergies Allergy   Unverified 09/14/18 19:32











Medications: 


 Current Medications





Amlodipine Besylate (Norvasc)  10 mg PO DAILY [facility]


   Last Admin: 09/22/18 09:02 Dose:  10 mg


Aspirin (Aspirin)  325 mg PO DAILY [facility]


   Last Admin: 09/22/18 09:01 Dose:  325 mg


Atorvastatin Calcium (Lipitor)  80 mg PO HS [facility]


   Last Admin: 09/21/18 20:13 Dose:  80 mg


Enoxaparin Sodium (Lovenox)  40 mg SC 0900 [facility]


   Last Admin: 09/22/18 09:02 Dose:  40 mg


Folic Acid (Folvite)  1 mg PO DAILY [facility]


   Last Admin: 09/22/18 09:02 Dose:  1 mg


Hydralazine HCl (Apresoline)  20 mg SLOW IVP Q15MIN PRN


   PRN Reason: SBP Greater Than 180


   Last Admin: 09/17/18 08:41 Dose:  20 mg


Hydrochlorothiazide (Hydrochlorothiazide)  25 mg PO DAILY [facility]


   Last Admin: 09/22/18 09:01 Dose:  25 mg


Labetalol HCl (Normodyne)  20 mg SLOW IVP Q10MIN PRN


   PRN Reason: SBP Greater Than 180


   Last Admin: 09/18/18 15:52 Dose:  20 mg


Lorazepam (Ativan)  1 mg PO Q4H PRN


   PRN Reason: Anxiety/Agitation


   Last Admin: 09/22/18 09:02 Dose:  1 mg


Lorazepam (Ativan)  2 mg SLOW IVP Q6H PRN


   PRN Reason: Anxiety/Agitation


   Last Admin: 09/17/18 20:20 Dose:  2 mg


Metoprolol Tartrate (Lopressor)  50 mg PO BID [facility]


   Last Admin: 09/22/18 09:01 Dose:  50 mg


Sodium Chloride (Flush - Normal Saline)  10 ml IVF Q12HR [facility]


   Last Admin: 09/22/18 09:03 Dose:  Not Given


Sodium Chloride (Flush - Normal Saline)  10 ml IVF PRN PRN


   PRN Reason: Saline Flush


Thiamine HCl (Thiamine)  100 mg PO DAILY [facility]


   Last Admin: 09/22/18 09:02 Dose:  100 mg

## 2018-09-23 RX ADMIN — Medication SCH: at 08:02

## 2018-09-23 RX ADMIN — Medication SCH: at 20:44

## 2018-09-23 NOTE — PDOC.PN
- Subjective


Encounter Start Date: 09/23/18


Encounter Start Time: 12:25


Subjective: awake, no sob or new weakness


-: communicates slowly


-: is not eating much today per staff





- Objective


MAR Reviewed: Yes


Vital Signs & Weight: 


 Vital Signs (12 hours)











  Temp Pulse Resp BP BP Pulse Ox


 


 09/23/18 12:00     164/90 H  


 


 09/23/18 11:11  98.2 F  80  16   164/90 H  97


 


 09/23/18 08:10   83    


 


 09/23/18 08:00     137/84   94 L


 


 09/23/18 07:14  97.9 F  83  16   137/84  96








 Weight











Admit Weight                   134 lb 14.766 oz


 


Weight                         105 lb 1.6 oz











 Most Recent Monitor Data











Heart Rate from ECG            66


 


NIBP                           144/68


 


NIBP BP-Mean                   99


 


Respiration from ECG           22


 


SpO2                           99














I&O: 


 











 09/22/18 09/23/18 09/24/18





 06:59 06:59 06:59


 


Intake Total  800 180


 


Balance  800 180











Result Diagrams: 


 09/22/18 09:41





 09/22/18 09:41





Phys Exam





- Physical Examination


HEENT: PERRLA, moist MMs


Neck: no JVD, supple


Respiratory: no wheezing, no rales


Cardiovascular: RRR, no significant murmur


Gastrointestinal: soft, non-tender, positive bowel sounds


Musculoskeletal: no edema, pulses present


Neurological: moves all 4 limbs


dysarthria, dysphasia


Psychiatric: A&O x 3





Dx/Plan


(1) CVA (cerebral vascular accident)


Code(s): I63.9 - CEREBRAL INFARCTION, UNSPECIFIED   Status: Acute   Comment: 

Had TPA and mechanical extraction of thrombus.  Primary residual is the 

expressive dysphasia.  Has some dysphagia as well.  Continue statin.  Add ASA.  

Continue with therapies.     





(2) Dyslipidemia


Code(s): E78.5 - HYPERLIPIDEMIA, UNSPECIFIED   Status: Chronic   





(3) Aphasia


Code(s): R47.01 - APHASIA   Status: Acute   





(4) Dysphagia


Code(s): R13.10 - DYSPHAGIA, UNSPECIFIED   Status: Acute   





(5) Hypertension


Code(s): I10 - ESSENTIAL (PRIMARY) HYPERTENSION   Status: Acute   


Qualifiers: 


   Hypertension type: essential hypertension   Qualified Code(s): I10 - 

Essential (primary) hypertension   


Comment: Much improved.  On Amlodipine, HCTZ, Metoprolol scheduled.     





- Plan


if oral intake is poor will need peg


-: hemo/neurostable


-: awaiting placement


-: asp, lipitor, lopressor, hctz


-: continue to mobilize with PT and speech therapies





* .








Review of Systems





- Medications/Allergies


Allergies/Adverse Reactions: 


 Allergies











Allergy/AdvReac Type Severity Reaction Status Date / Time


 


No Known Allergies Allergy   Unverified 09/14/18 19:32











Medications: 


 Current Medications





Amlodipine Besylate (Norvasc)  10 mg PO DAILY [facility]


   Last Admin: 09/23/18 08:10 Dose:  Not Given


Aspirin (Aspirin)  325 mg PO DAILY [facility]


   Last Admin: 09/23/18 08:10 Dose:  Not Given


Atorvastatin Calcium (Lipitor)  80 mg PO HS [facility]


   Last Admin: 09/22/18 21:48 Dose:  80 mg


Enoxaparin Sodium (Lovenox)  40 mg SC 0900 [facility]


   Last Admin: 09/23/18 08:10 Dose:  Not Given


Folic Acid (Folvite)  1 mg PO DAILY [facility]


   Last Admin: 09/23/18 08:10 Dose:  Not Given


Hydralazine HCl (Apresoline)  20 mg SLOW IVP Q15MIN PRN


   PRN Reason: SBP Greater Than 180


   Last Admin: 09/17/18 08:41 Dose:  20 mg


Hydrochlorothiazide (Hydrochlorothiazide)  25 mg PO DAILY [facility]


   Last Admin: 09/23/18 08:10 Dose:  Not Given


Labetalol HCl (Normodyne)  20 mg SLOW IVP Q10MIN PRN


   PRN Reason: SBP Greater Than 180


   Last Admin: 09/18/18 15:52 Dose:  20 mg


Lorazepam (Ativan)  1 mg PO Q4H PRN


   PRN Reason: Anxiety/Agitation


   Last Admin: 09/22/18 09:02 Dose:  1 mg


Lorazepam (Ativan)  2 mg SLOW IVP Q6H PRN


   PRN Reason: Anxiety/Agitation


   Last Admin: 09/17/18 20:20 Dose:  2 mg


Metoprolol Tartrate (Lopressor)  50 mg PO BID [facility]


   Last Admin: 09/23/18 08:10 Dose:  Not Given


Sodium Chloride (Flush - Normal Saline)  10 ml IVF Q12HR [facility]


   Last Admin: 09/23/18 08:02 Dose:  Not Given


Sodium Chloride (Flush - Normal Saline)  10 ml IVF PRN PRN


   PRN Reason: Saline Flush


Thiamine HCl (Thiamine)  100 mg PO DAILY [facility]


   Last Admin: 09/23/18 08:10 Dose:  Not Given

## 2018-09-24 RX ADMIN — Medication SCH: at 08:11

## 2018-09-24 NOTE — PDOC.PN
- Subjective


Encounter Start Date: 09/24/18


Encounter Start Time: 10:40


Subjective: awake, not in distress


-: ate his lunch and dinner well with family





- Objective


MAR Reviewed: Yes


Vital Signs & Weight: 


 Vital Signs (12 hours)











  Temp Pulse Resp BP BP Pulse Ox


 


 09/24/18 08:11   83   146/79 H  


 


 09/24/18 08:00     146/79 H   96


 


 09/24/18 07:34  97.8 F  83  18   146/79 H  96


 


 09/24/18 04:00  97.6 F  81  18  142/80 H  142/80 H  96


 


 09/24/18 00:00  98.4 F  69  18  137/80  137/80  96








 Weight











Admit Weight                   134 lb 14.766 oz


 


Weight                         105 lb 1 oz











 Most Recent Monitor Data











Heart Rate from ECG            66


 


NIBP                           144/68


 


NIBP BP-Mean                   99


 


Respiration from ECG           22


 


SpO2                           99














I&O: 


 











 09/23/18 09/24/18 09/25/18





 06:59 06:59 06:59


 


Intake Total 800 360 180


 


Balance 800 360 180











Result Diagrams: 


 09/22/18 09:41





 09/22/18 09:41





Phys Exam





- Physical Examination


HEENT: PERRLA, moist MMs


Neck: no JVD, supple


Respiratory: no wheezing, no rales


Cardiovascular: RRR, no significant murmur


Gastrointestinal: soft, non-tender, positive bowel sounds


Musculoskeletal: no edema, pulses present


Neurological: moves all 4 limbs





Dx/Plan


(1) CVA (cerebral vascular accident)


Code(s): I63.9 - CEREBRAL INFARCTION, UNSPECIFIED   Status: Acute   Comment: 

Had TPA and mechanical extraction of thrombus.  Primary residual is the 

expressive dysphasia.  Has some dysphagia as well.  Continue statin.  Add ASA.  

Continue with therapies.     





(2) Dyslipidemia


Code(s): E78.5 - HYPERLIPIDEMIA, UNSPECIFIED   Status: Chronic   





(3) Aphasia


Code(s): R47.01 - APHASIA   Status: Acute   





(4) Dysphagia


Code(s): R13.10 - DYSPHAGIA, UNSPECIFIED   Status: Acute   





(5) Hypertension


Code(s): I10 - ESSENTIAL (PRIMARY) HYPERTENSION   Status: Acute   


Qualifiers: 


   Hypertension type: essential hypertension   Qualified Code(s): I10 - 

Essential (primary) hypertension   


Comment: Much improved.  On Amlodipine, Metoprolol scheduled.     





- Plan


is eating better, on going speech eval


-: hemo/neuro stable


-: awaiting placement, may dc anytime if ready


-: continue asp, lipitor, lopressor and norvasc


-: to ambulate with PT as tolerated





* .








Review of Systems





- Medications/Allergies


Allergies/Adverse Reactions: 


 Allergies











Allergy/AdvReac Type Severity Reaction Status Date / Time


 


No Known Allergies Allergy   Unverified 09/14/18 19:32











Medications: 


 Current Medications





Amlodipine Besylate (Norvasc)  10 mg PO DAILY [facility]


   Last Admin: 09/24/18 08:11 Dose:  10 mg


Aspirin (Aspirin)  325 mg PO DAILY [facility]


   Last Admin: 09/24/18 08:10 Dose:  325 mg


Atorvastatin Calcium (Lipitor)  80 mg PO HS [facility]


   Last Admin: 09/23/18 20:40 Dose:  80 mg


Enoxaparin Sodium (Lovenox)  40 mg SC 0900 [facility]


   Last Admin: 09/24/18 08:11 Dose:  40 mg


Folic Acid (Folvite)  1 mg PO DAILY [facility]


   Last Admin: 09/24/18 08:10 Dose:  1 mg


Hydralazine HCl (Apresoline)  20 mg SLOW IVP Q15MIN PRN


   PRN Reason: SBP Greater Than 180


   Last Admin: 09/17/18 08:41 Dose:  20 mg


Labetalol HCl (Normodyne)  20 mg SLOW IVP Q10MIN PRN


   PRN Reason: SBP Greater Than 180


   Last Admin: 09/18/18 15:52 Dose:  20 mg


Lorazepam (Ativan)  1 mg PO Q4H PRN


   PRN Reason: Anxiety/Agitation


   Last Admin: 09/22/18 09:02 Dose:  1 mg


Lorazepam (Ativan)  2 mg SLOW IVP Q6H PRN


   PRN Reason: Anxiety/Agitation


   Last Admin: 09/17/18 20:20 Dose:  2 mg


Metoprolol Tartrate (Lopressor)  50 mg PO BID [facility]


   Last Admin: 09/24/18 08:10 Dose:  50 mg


Sodium Chloride (Flush - Normal Saline)  10 ml IVF Q12HR [facility]


   Last Admin: 09/24/18 08:11 Dose:  Not Given


Sodium Chloride (Flush - Normal Saline)  10 ml IVF PRN PRN


   PRN Reason: Saline Flush


Thiamine HCl (Thiamine)  100 mg PO DAILY [facility]


   Last Admin: 09/24/18 08:10 Dose:  100 mg

## 2018-09-25 NOTE — PDOC.PN
- Subjective


Encounter Start Date: 09/25/18


Encounter Start Time: 11:00


Subjective: no sob or pain


-: awake, responds to verbal stimuli minimally


-: eats well when family is around





- Objective


MAR Reviewed: Yes


Vital Signs & Weight: 


 Vital Signs (12 hours)











  Temp Pulse Resp BP BP Pulse Ox


 


 09/25/18 09:14   70    


 


 09/25/18 08:00     92/60  


 


 09/25/18 07:49  97.4 F L  70  20   92/60  97








 Weight











Admit Weight                   134 lb 14.766 oz


 


Weight                         105 lb 1 oz











 Most Recent Monitor Data











Heart Rate from ECG            66


 


NIBP                           144/68


 


NIBP BP-Mean                   99


 


Respiration from ECG           22


 


SpO2                           99














I&O: 


 











 09/24/18 09/25/18 09/26/18





 06:59 06:59 06:59


 


Intake Total 360 255 200


 


Balance 360 255 200











Result Diagrams: 


 09/22/18 09:41





 09/22/18 09:41





Phys Exam





- Physical Examination


HEENT: PERRLA, moist MMs


Neck: no JVD, supple


Respiratory: no wheezing, no rales


Cardiovascular: RRR, no significant murmur


Gastrointestinal: soft, non-tender, positive bowel sounds


Musculoskeletal: no edema, pulses present


Neurological: non-focal, moves all 4 limbs


has cognitive defecits, follows simple verbal stimuli, dysarthria





Dx/Plan


(1) CVA (cerebral vascular accident)


Code(s): I63.9 - CEREBRAL INFARCTION, UNSPECIFIED   Status: Acute   Comment: s/

p TPA and mechanical extraction of thrombus.  Primary residual is the 

expressive dysphasia.  Continue statin and ASA.  Continue with therapies.     





(2) Dyslipidemia


Code(s): E78.5 - HYPERLIPIDEMIA, UNSPECIFIED   Status: Chronic   





(3) Aphasia


Code(s): R47.01 - APHASIA   Status: Acute   





(4) Dysphagia


Code(s): R13.10 - DYSPHAGIA, UNSPECIFIED   Status: Acute   





(5) Hypertension


Code(s): I10 - ESSENTIAL (PRIMARY) HYPERTENSION   Status: Acute   


Qualifiers: 


   Hypertension type: essential hypertension   Qualified Code(s): I10 - 

Essential (primary) hypertension   


Comment: Much improved.  On Amlodipine, Metoprolol scheduled.     





- Plan


hemo/neurostable


-: awaiting placement, delayed due to insurance approval


-: on asp, lipitor, norvasc and lopressor


-: to mobilize as tolerated





* .








Review of Systems





- Medications/Allergies


Allergies/Adverse Reactions: 


 Allergies











Allergy/AdvReac Type Severity Reaction Status Date / Time


 


No Known Allergies Allergy   Unverified 09/14/18 19:32











Medications: 


 Current Medications





Amlodipine Besylate (Norvasc)  10 mg PO DAILY [facility]


   Last Admin: 09/25/18 09:14 Dose:  10 mg


Aspirin (Aspirin)  325 mg PO DAILY [facility]


   Last Admin: 09/25/18 09:13 Dose:  325 mg


Atorvastatin Calcium (Lipitor)  80 mg PO HS [facility]


   Last Admin: 09/24/18 19:54 Dose:  80 mg


Enoxaparin Sodium (Lovenox)  40 mg SC 0900 [facility]


   Last Admin: 09/25/18 09:14 Dose:  40 mg


Folic Acid (Folvite)  1 mg PO DAILY [facility]


   Last Admin: 09/25/18 09:14 Dose:  1 mg


Lorazepam (Ativan)  1 mg PO Q4H PRN


   PRN Reason: Anxiety/Agitation


   Last Admin: 09/24/18 19:53 Dose:  1 mg


Metoprolol Tartrate (Lopressor)  50 mg PO BID [facility]


   Last Admin: 09/25/18 09:13 Dose:  50 mg


Thiamine HCl (Thiamine)  100 mg PO DAILY [facility]


   Last Admin: 09/25/18 09:13 Dose:  100 mg

## 2018-09-26 VITALS — TEMPERATURE: 97.7 F | DIASTOLIC BLOOD PRESSURE: 85 MMHG | SYSTOLIC BLOOD PRESSURE: 128 MMHG

## 2018-09-26 NOTE — PDOC.PN
- Subjective


Encounter Start Date: 09/26/18


Encounter Start Time: 07:45


Subjective: sitting in chair, not in distress


-: responds to simple questions and speaks a bit





- Objective


MAR Reviewed: Yes


Vital Signs & Weight: 


 Vital Signs (12 hours)











  Temp Pulse Resp BP BP Pulse Ox


 


 09/26/18 08:23   77    


 


 09/26/18 07:19  97.9 F  77  16   152/85 H  95


 


 09/26/18 04:29  98.1 F  77  18   150/91 H  93 L


 


 09/26/18 04:00     150/91 H  








 Weight











Admit Weight                   134 lb 14.766 oz


 


Weight                         105 lb 1 oz











 Most Recent Monitor Data











Heart Rate from ECG            66


 


NIBP                           144/68


 


NIBP BP-Mean                   99


 


Respiration from ECG           22


 


SpO2                           99














I&O: 


 











 09/25/18 09/26/18 09/27/18





 06:59 06:59 06:59


 


Intake Total 255 570 240


 


Balance 255 570 240











Result Diagrams: 


 09/22/18 09:41





 09/22/18 09:41





Phys Exam





- Physical Examination


HEENT: PERRLA, sclera anicteric


Neck: no JVD, supple


Respiratory: no wheezing, no rales


Cardiovascular: RRR, no significant murmur


Gastrointestinal: soft, non-tender, positive bowel sounds


Musculoskeletal: no edema, pulses present


Neurological: non-focal, moves all 4 limbs





Dx/Plan


(1) CVA (cerebral vascular accident)


Code(s): I63.9 - CEREBRAL INFARCTION, UNSPECIFIED   Status: Acute   Comment: s/

p TPA and mechanical extraction of thrombus.  Primary residual is the 

expressive dysphasia.  Continue statin and ASA.  Continue with therapies.     





(2) Dyslipidemia


Code(s): E78.5 - HYPERLIPIDEMIA, UNSPECIFIED   Status: Chronic   





(3) Aphasia


Code(s): R47.01 - APHASIA   Status: Acute   Comment: improving   





(4) Dysphagia


Code(s): R13.10 - DYSPHAGIA, UNSPECIFIED   Status: Acute   Comment: is 

improving   





(5) Hypertension


Code(s): I10 - ESSENTIAL (PRIMARY) HYPERTENSION   Status: Acute   


Qualifiers: 


   Hypertension type: essential hypertension   Qualified Code(s): I10 - 

Essential (primary) hypertension   


Comment: Much improved.  On Amlodipine, Metoprolol scheduled.     





- Plan


hemo/neurostable


-: encourage po intake


-: may dc anytime placement is ready


-: is on asp, lipitor, norvasc and lopressor


-: PT/OT/Speech ongoing evaluations on floor





* .








Review of Systems





- Medications/Allergies


Allergies/Adverse Reactions: 


 Allergies











Allergy/AdvReac Type Severity Reaction Status Date / Time


 


No Known Allergies Allergy   Unverified 09/14/18 19:32











Medications: 


 Current Medications





Amlodipine Besylate (Norvasc)  10 mg PO DAILY [facility]


   Last Admin: 09/26/18 08:23 Dose:  10 mg


Aspirin (Aspirin)  325 mg PO DAILY [facility]


   Last Admin: 09/26/18 08:23 Dose:  325 mg


Atorvastatin Calcium (Lipitor)  80 mg PO HS [facility]


   Last Admin: 09/25/18 20:31 Dose:  80 mg


Enoxaparin Sodium (Lovenox)  40 mg SC 0900 [facility]


   Last Admin: 09/26/18 08:23 Dose:  40 mg


Folic Acid (Folvite)  1 mg PO DAILY [facility]


   Last Admin: 09/26/18 08:23 Dose:  1 mg


Lorazepam (Ativan)  1 mg PO Q4H PRN


   PRN Reason: Anxiety/Agitation


   Last Admin: 09/25/18 20:32 Dose:  1 mg


Metoprolol Tartrate (Lopressor)  50 mg PO BID [facility]


   Last Admin: 09/26/18 08:23 Dose:  50 mg


Thiamine HCl (Thiamine)  100 mg PO DAILY [facility]


   Last Admin: 09/26/18 08:23 Dose:  100 mg

## 2023-11-12 NOTE — DIS
DATE OF ADMISSION:  09/14/2018

 

DATE OF DISCHARGE:  09/26/2018

 

DISCHARGE DISPOSITION:  To [facility].

 

PRIMARY DISCHARGE DIAGNOSES:  Acute cerebrovascular accident, status post TPA and mechanical extracti
on of thrombus with residual expressive aphasia and mild dysphagia.

 

SECONDARY DISCHARGE DIAGNOSES:  Hypertension and dyslipidemia.

 

PROCEDURES DONE DURING HOSPITALIZATION:  The patient has had CT angio of brain done on the day of adm
ission, which showed thrombus involving the left M1 segment.  There was moderate stenosis involving p
roximal left internal carotid artery.  CT brain showed hyperdense left MCA sign suggesting acute thro
mbus within the left middle cerebral artery.  There was also lacunar infarct seen in each basal gangl
ia of indeterminate age.  Remote fracture and deformity of the nasal bone is seen.  The patient has h
ad cerebral angiography with mechanical thrombectomy done of left MCA occlusion done by Dr. Collin Ocampo on 09/14/2018.  Echo with 2D Doppler showed EF of 60%-65%.  There was diastolic dysfunctio
n.  No thrombus was seen in the cardiac chambers.  Modified barium swallow done for speech therapy sh
owed aspiration with various materials.  This was done on 09/19/2018.  H and H 17 and 51, platelet co
unt 321,000, white count of 9, MCV is 97.  PT, INR, PTT on admission was normal.  Discharge BUN and c
reatinine is 20 and 1.8.  Albumin is 3.9.

 

DISCHARGE MEDICATIONS:  Norvasc 10 mg daily, aspirin 325 mg daily, Lipitor 80 mg p.o. at bedtime, fol
ic acid 1 mg daily, Lopressor 50 mg twice daily, Flomax 0.4 mg daily, omeprazole 20 mg daily.

 

ALLERGIES:  No known drug allergies.

 

DISCHARGE PLAN:  The patient to follow up with primary care physician in 1 week.

 

INPATIENT CONSULTS:  Dr. Mixon for Neurology, Dr. Bosch for Pulmonary and Critical Care, Dr. Clarke Meraz for Intervention Neurosurgery.

 

BRIEF COURSE DURING HOSPITALIZATION:  The patient initially was brought to emergency room for altered
 mental state and weakness.  He had a CT brain done, which showed hyperdense left MCA sign suggesting
 acute thrombus.  His CT angio brain was done, which showed left M2 segment thrombus.  Dr. Meraz, int
erventional neurosurgeon was consulted.  The patient received TPA and later had mechanical thrombecto
my done as well.  Post-mechanical thrombectomy, there was good restoration of blood flow into the mid
dle cerebral artery.  The patient had complete stroke workup done.  Echo done showed good ejection fr
action with no thrombus.  The patient has cognitive deficits with dysarthria and aphasia.  He is work
ing with physical therapy, occupational therapy and speech therapies.  The patient has been eating be
tter when family is around.  He still needs cognitive improvement.  He is being discharged to [facility] for further recuperation prior to going home.  He needs ongoing speech therapy evalua
tion along with PT, OT evaluations at the nursing home upon discharge.

 

A total of 35 minutes was spent on discharge plan.  Please see a face-to-face documentation on [facility] for the day of discharge. Never